# Patient Record
Sex: MALE | Race: ASIAN | Employment: FULL TIME | URBAN - METROPOLITAN AREA
[De-identification: names, ages, dates, MRNs, and addresses within clinical notes are randomized per-mention and may not be internally consistent; named-entity substitution may affect disease eponyms.]

---

## 2024-08-06 ENCOUNTER — OFFICE VISIT (OUTPATIENT)
Age: 59
End: 2024-08-06

## 2024-08-06 VITALS
HEART RATE: 69 BPM | SYSTOLIC BLOOD PRESSURE: 126 MMHG | HEIGHT: 67 IN | OXYGEN SATURATION: 98 % | DIASTOLIC BLOOD PRESSURE: 82 MMHG | WEIGHT: 154.8 LBS | TEMPERATURE: 98.4 F | RESPIRATION RATE: 18 BRPM | BODY MASS INDEX: 24.3 KG/M2

## 2024-08-06 DIAGNOSIS — I10 HYPERTENSION, UNSPECIFIED TYPE: Primary | ICD-10-CM

## 2024-08-06 DIAGNOSIS — E78.5 HYPERLIPIDEMIA, UNSPECIFIED HYPERLIPIDEMIA TYPE: ICD-10-CM

## 2024-08-06 DIAGNOSIS — J30.2 SEASONAL ALLERGIES: ICD-10-CM

## 2024-08-06 RX ORDER — TELMISARTAN 40 MG/1
40 TABLET ORAL DAILY
COMMUNITY
End: 2024-08-06 | Stop reason: ALTCHOICE

## 2024-08-06 RX ORDER — METOPROLOL SUCCINATE 25 MG/1
25 TABLET, EXTENDED RELEASE ORAL DAILY
COMMUNITY

## 2024-08-06 RX ORDER — ROSUVASTATIN CALCIUM 10 MG/1
10 TABLET, COATED ORAL DAILY
Qty: 30 TABLET | Refills: 1 | Status: SHIPPED | OUTPATIENT
Start: 2024-08-06

## 2024-08-06 RX ORDER — FLUTICASONE PROPIONATE 50 MCG
2 SPRAY, SUSPENSION (ML) NASAL DAILY
Qty: 16 G | Refills: 0 | Status: SHIPPED | OUTPATIENT
Start: 2024-08-06

## 2024-08-06 RX ORDER — TELMISARTAN 40 MG/1
40 TABLET ORAL DAILY
Qty: 30 TABLET | Refills: 1 | Status: SHIPPED | OUTPATIENT
Start: 2024-08-06

## 2024-08-06 RX ORDER — LEVOTHYROXINE SODIUM 0.05 MG/1
50 TABLET ORAL DAILY
COMMUNITY

## 2024-08-06 RX ORDER — EZETIMIBE 10 MG/1
10 TABLET ORAL DAILY
Qty: 30 TABLET | Refills: 1 | Status: SHIPPED | OUTPATIENT
Start: 2024-08-06

## 2024-08-06 NOTE — PROGRESS NOTES
Justino Rain (:  1965) is a 59 y.o. male,Established patient, here for evaluation of the following chief complaint(s):  Medication Refill (Pt has hbp and high cholesterol requesting refill on meds due to PCP appt not available until end of sept. Pt has green card and meds are from [place]. He has not had meds in 2 weeks. / /Coughing x 1 week and sinus congestion. Denies fever.  Not concerned for covid or flu)      Assessment & Plan :  Visit Diagnoses and Associated Orders       Hypertension, unspecified type    -  Primary    metoprolol tartrate (LOPRESSOR) 25 MG tablet [04503]      telmisartan (MICARDIS) 40 MG tablet [38999]           Hyperlipidemia, unspecified hyperlipidemia type        ezetimibe (ZETIA) 10 MG tablet [69392]      rosuvastatin (CRESTOR) 10 MG tablet [75565]           Seasonal allergies        fluticasone (FLONASE) 50 MCG/ACT nasal spray [35997]           ORDERS WITHOUT AN ASSOCIATED DIAGNOSIS    metoprolol succinate (TOPROL XL) 25 MG extended release tablet [[id number]]      levothyroxine (SYNTHROID) 50 MCG tablet [[id number]]               60 day fills sent to pharmacy for metoprolol, telmisartan, ezetimibe, rosuvastatin. To follow-up with new PCP for all future fills.    Clinical manifestations and exam findings consistent with seasonal AR.  Mild symptoms, VSS, afebrile. To treat for AR with flonase nasal spray. To monitor symptoms and follow-up if symptoms worsen or do not improve on current treatment plan. To ED for severe CP, chest tightness, SOB, rapid worsening of symptoms. Patient verbalized understanding, no questions or concerns at this time .    Follow up in PRN days if symptoms persist or if symptoms worsen.       Subjective :  HPI  HPI:   59 y.o. male presents with symptoms of productive cough with clear sputum, rhinitis, nasal congestion, sneezing > 1 week. He has been working in the garden lately. He has a history of seasonal allergies. Denies pain. Has not tried anything to treat

## 2024-09-04 DIAGNOSIS — I10 HYPERTENSION, UNSPECIFIED TYPE: ICD-10-CM

## 2024-09-05 RX ORDER — METOPROLOL TARTRATE 25 MG/1
25 TABLET, FILM COATED ORAL DAILY
Qty: 30 TABLET | Refills: 0 | OUTPATIENT
Start: 2024-09-05 | End: 2024-10-05

## 2024-10-06 DIAGNOSIS — I10 HYPERTENSION, UNSPECIFIED TYPE: ICD-10-CM

## 2024-10-08 RX ORDER — TELMISARTAN 40 MG/1
40 TABLET ORAL DAILY
Qty: 30 TABLET | Refills: 1 | OUTPATIENT
Start: 2024-10-08

## 2025-01-17 DIAGNOSIS — E78.5 HYPERLIPIDEMIA, UNSPECIFIED HYPERLIPIDEMIA TYPE: ICD-10-CM

## 2025-01-17 RX ORDER — EZETIMIBE 10 MG/1
10 TABLET ORAL DAILY
Qty: 30 TABLET | Refills: 1 | OUTPATIENT
Start: 2025-01-17